# Patient Record
Sex: FEMALE | Race: WHITE | Employment: FULL TIME | URBAN - NONMETROPOLITAN AREA
[De-identification: names, ages, dates, MRNs, and addresses within clinical notes are randomized per-mention and may not be internally consistent; named-entity substitution may affect disease eponyms.]

---

## 2020-09-28 ENCOUNTER — OFFICE VISIT (OUTPATIENT)
Dept: OTOLARYNGOLOGY | Age: 35
End: 2020-09-28
Payer: COMMERCIAL

## 2020-09-28 VITALS
BODY MASS INDEX: 49.26 KG/M2 | SYSTOLIC BLOOD PRESSURE: 132 MMHG | HEIGHT: 63 IN | DIASTOLIC BLOOD PRESSURE: 80 MMHG | WEIGHT: 278 LBS

## 2020-09-28 DIAGNOSIS — K14.0 GLOSSITIS: ICD-10-CM

## 2020-09-28 LAB
FOLATE: 10.1 NG/ML (ref 4.8–37.3)
IRON: 57 UG/DL (ref 37–145)
VITAMIN B-12: 371 PG/ML (ref 211–946)

## 2020-09-28 PROCEDURE — 99243 OFF/OP CNSLTJ NEW/EST LOW 30: CPT | Performed by: OTOLARYNGOLOGY

## 2020-09-28 RX ORDER — LORATADINE 10 MG/1
TABLET ORAL
COMMUNITY
Start: 2020-09-04

## 2020-09-28 RX ORDER — OMEPRAZOLE 20 MG/1
CAPSULE, DELAYED RELEASE ORAL
COMMUNITY
Start: 2020-09-04

## 2020-09-28 RX ORDER — METHYLPREDNISOLONE 4 MG/1
TABLET ORAL
COMMUNITY
Start: 2020-09-22

## 2020-09-28 NOTE — ASSESSMENT & PLAN NOTE
For past month or so patient reports intermittent tongue inflammation. She had photographs showing a deep fissure down the middle of her tongue with some surrounding inflammation. She states that she could feel nodules beneath the surface of her tongue. No cristobal ulceration is reported. She has been treated with antibiotics nystatin rinse and steroids. Currently she is much improved which she attributes to the steroids. glossitis

## 2020-09-28 NOTE — PROGRESS NOTES
DENTAL SURGERY      all teeth removed     WISDOM TOOTH EXTRACTION           REVIEW OF SYSTEMS:  all other systems reviewed and are negative  General Health: see HPI / problem list  Throat: See HPI       Comments:     PHYSICAL EXAM:    /80   Ht 5' 3\" (1.6 m)   Wt 278 lb (126.1 kg)   BMI 49.25 kg/m²   Body mass index is 49.25 kg/m². General Appearance: well developed , well nourished and obese  Head/ Face: normocephalic and atraumatic  Vocal Quality: good/ normal  Oral:lips: normal Oropharynx:normal tongue: No cracks or fissures in tongue on today's exam.  Actually her tongue was rather smooth and glassy. No palpable nodules. Unremarkable exam   Dentition: edentulous full   Neck: negative and supple  Psych/ Mood: cooperative    Assessment & Plan:    Problem List Items Addressed This Visit     Glossitis     For past month or so patient reports intermittent tongue inflammation. She had photographs showing a deep fissure down the middle of her tongue with some surrounding inflammation. She states that she could feel nodules beneath the surface of her tongue. No cristobal ulceration is reported. She has been treated with antibiotics nystatin rinse and steroids. Currently she is much improved which she attributes to the steroids. glossitis         Relevant Orders    Vitamin B1    Niacin (Vitamin B3)    Iron    Vitamin B12 & Folate    JOSE ROBERTO Screen with Reflex          Orders Placed This Encounter   Procedures    Vitamin B1     Standing Status:   Future     Standing Expiration Date:   9/28/2021    Niacin (Vitamin B3)     Standing Status:   Future     Standing Expiration Date:   9/28/2021    Iron     Order Specific Question:   Is Patient Fasting? Answer:   No     Order Specific Question:   No of Hours?      Answer:   3    Vitamin B12 & Folate     Standing Status:   Future     Standing Expiration Date:   9/28/2021    JOSE ROBERTO Screen with Reflex     Standing Status:   Future     Standing Expiration Date: 9/28/2021       No orders of the defined types were placed in this encounter. Please note that this chart was generated using dragon dictation software. Although every effort was made to ensure the accuracy of this automated transcription, some errors in transcription may have occurred.

## 2020-09-30 ENCOUNTER — TELEPHONE (OUTPATIENT)
Dept: OTOLARYNGOLOGY | Age: 35
End: 2020-09-30

## 2020-09-30 LAB — ANA IGG, ELISA: NORMAL

## 2020-09-30 NOTE — TELEPHONE ENCOUNTER
I was calling patient with results per Dr. Rhodia Goodpasture,     ----- Message from Kathleen Carrasco MD sent at 9/30/2020  8:27 AM CDT -----  Let patient know that her iron level was normal and send a copy to the referring doctor  ----- Message -----  From: Nando Cabrera Incoming Lab Results From [facility]  Sent: 9/28/2020   7:02 PM CDT  To: Kathleen Carrasco MD      Patient voiced agreement.

## 2020-10-03 LAB — VITAMIN B1, PLASMA: 5 NMOL/L (ref 4–15)

## 2020-10-06 LAB — NIACIN: 1.6 UG/ML (ref 0.5–8.45)

## 2020-10-13 RX ORDER — FLUCONAZOLE 100 MG/1
TABLET ORAL
Qty: 15 TABLET | Refills: 0 | Status: SHIPPED | OUTPATIENT
Start: 2020-10-13

## 2020-10-13 NOTE — PROGRESS NOTES
Patient grew yeast on mouth culture, Dr Neel Duffy wanted to prescribe Diflucan to treat patients symptoms. I sent a prescription into her pharmacy.

## 2020-11-02 LAB
FUNGUS (MYCOLOGY) CULTURE: ABNORMAL
KOH PREP: ABNORMAL
ORGANISM: ABNORMAL

## 2023-07-12 PROBLEM — K14.0 GLOSSITIS: Status: ACTIVE | Noted: 2020-09-28

## 2023-07-19 ENCOUNTER — PREP FOR SURGERY (OUTPATIENT)
Dept: OTHER | Facility: HOSPITAL | Age: 38
End: 2023-07-19
Payer: COMMERCIAL

## 2023-07-19 DIAGNOSIS — R13.10 DYSPHAGIA, UNSPECIFIED TYPE: Primary | ICD-10-CM

## 2023-07-27 ENCOUNTER — OFFICE VISIT (OUTPATIENT)
Dept: FAMILY MEDICINE CLINIC | Facility: CLINIC | Age: 38
End: 2023-07-27
Payer: COMMERCIAL

## 2023-07-27 VITALS
HEIGHT: 63 IN | OXYGEN SATURATION: 97 % | SYSTOLIC BLOOD PRESSURE: 100 MMHG | BODY MASS INDEX: 50.85 KG/M2 | HEART RATE: 95 BPM | DIASTOLIC BLOOD PRESSURE: 70 MMHG | WEIGHT: 287 LBS

## 2023-07-27 DIAGNOSIS — E11.65 TYPE 2 DIABETES MELLITUS WITH HYPERGLYCEMIA, WITHOUT LONG-TERM CURRENT USE OF INSULIN: Primary | ICD-10-CM

## 2023-07-27 DIAGNOSIS — E78.2 MIXED HYPERLIPIDEMIA: ICD-10-CM

## 2023-07-27 DIAGNOSIS — R79.89 LOW SERUM VITAMIN D: ICD-10-CM

## 2023-07-27 RX ORDER — ERGOCALCIFEROL 1.25 MG/1
CAPSULE ORAL
Qty: 4 CAPSULE | Refills: 5 | Status: SHIPPED | OUTPATIENT
Start: 2023-07-27

## 2023-07-27 RX ORDER — METFORMIN HYDROCHLORIDE 500 MG/1
TABLET, EXTENDED RELEASE ORAL
Qty: 60 TABLET | Refills: 5 | Status: SHIPPED | OUTPATIENT
Start: 2023-07-27

## 2023-07-27 RX ORDER — ROSUVASTATIN CALCIUM 5 MG/1
5 TABLET, COATED ORAL DAILY
Qty: 90 TABLET | Refills: 1 | Status: SHIPPED | OUTPATIENT
Start: 2023-07-27

## 2023-07-27 NOTE — PROGRESS NOTES
Office Note     Name: Gauri Gordon    : 1985     MRN: [id number]     Chief Complaint  follow up on blood work    Subjective     History of Present Illness:  Gauri Gordon is a 37 y.o. female who presents today for follow-up to lab work. She reports she has cut back on monster drinks.  She does work third shift at the hotel and states she often only eats Ramen noodles for dinner.     Review of Systems:   Review of Systems as per HPI.     Family History:   Family History   Problem Relation Age of Onset    Ovarian cancer Mother     Alcohol abuse Mother     Cancer Mother     Depression Mother     Diabetes Father     Hypertension Father     Hyperlipidemia Father     Throat cancer Maternal Uncle     COPD Maternal Grandfather     Cancer Maternal Grandmother     Hearing loss Maternal Grandmother     Diabetes Paternal Grandmother     Alcohol abuse Maternal Uncle     Cancer Maternal Uncle     Diabetes Paternal Aunt        Social History:   Social History     Socioeconomic History    Marital status: Single   Tobacco Use    Smoking status: Every Day     Packs/day: 0.50     Years: 20.00     Pack years: 10.00     Types: Cigarettes     Start date:      Passive exposure: Current    Tobacco comments:     Been a smoker most of my life   Vaping Use    Vaping Use: Never used   Substance and Sexual Activity    Alcohol use: Not Currently     Comment: Might drink once every few months and its just a beer or 2    Drug use: Never    Sexual activity: Yes     Partners: Male     Birth control/protection: Condom       Past Medical History:   Past Medical History:   Diagnosis Date    Anxiety     Asthma     GERD (gastroesophageal reflux disease)     History of medical problems     PCOS i was 16 cant remember dates    Obesity        Past Surgical History:   Past Surgical History:   Procedure Laterality Date     SECTION  2006       Immunizations:   Immunization History   Administered Date(s)  [facility]    Phone Message    May a detailed message be left on voicemail: yes     Reason for Call: Sonam from [facility] calling to verify the procedure the patient had done on 4/19 with Dr. Love.   Sonam can be reach at [phone]   Claim Number: [id number]    Thank you!    Action Taken: Message routed to:  Other: [facility]    Travel Screening: Not Applicable                                                                         "Administered    DTP 04/22/1986, 02/04/1987, 04/22/1987, 09/21/1988, 06/06/1990    Hep B, Adolescent or Pediatric 08/02/2000, 08/27/2002    HiB 09/21/1988    MMR 04/22/1987, 08/26/1997    Polio, Unspecified 04/22/1986, 02/04/1987, 09/26/1988, 06/06/1990    Td (TDVAX) 08/27/2002        Medications:     Current Outpatient Medications:     busPIRone (BUSPAR) 10 MG tablet, , Disp: , Rfl:     famotidine (PEPCID) 20 MG tablet, Take 1 tablet by mouth 2 (Two) Times a Day., Disp: , Rfl:     metFORMIN ER (GLUCOPHAGE-XR) 500 MG 24 hr tablet, Take 1 tablet at bedtime daily for 2 weeks, then increase to 2 tablets at bedtime daily, Disp: 60 tablet, Rfl: 5    rosuvastatin (Crestor) 5 MG tablet, Take 1 tablet by mouth Daily., Disp: 90 tablet, Rfl: 1    vitamin D (ERGOCALCIFEROL) 1.25 MG (65403 UT) capsule capsule, Take 1 capsules once a week, Disp: 4 capsule, Rfl: 5    Allergies:   Allergies   Allergen Reactions    Sulfamethoxazole-Trimethoprim Other (See Comments)     Flu like symptoms       Objective     Vital Signs  /70 (BP Location: Right arm, Patient Position: Sitting, Cuff Size: Large Adult)   Pulse 95   Ht 160 cm (63\")   Wt 130 kg (287 lb)   SpO2 97%   BMI 50.84 kg/m²   Estimated body mass index is 50.84 kg/m² as calculated from the following:    Height as of this encounter: 160 cm (63\").    Weight as of this encounter: 130 kg (287 lb).            Physical Exam  Constitutional:       Appearance: Normal appearance.   HENT:      Head: Normocephalic and atraumatic.      Nose: Nose normal.      Mouth/Throat:      Mouth: Mucous membranes are moist.      Pharynx: Oropharynx is clear.   Eyes:      Conjunctiva/sclera: Conjunctivae normal.      Pupils: Pupils are equal, round, and reactive to light.   Cardiovascular:      Rate and Rhythm: Normal rate and regular rhythm.   Pulmonary:      Effort: Pulmonary effort is normal.   Skin:     General: Skin is warm and dry.   Neurological:      Mental Status: She is alert and " oriented to person, place, and time.   Psychiatric:         Mood and Affect: Mood normal.         Behavior: Behavior normal.        Procedures    Assessment and Plan     1. Type 2 diabetes mellitus with hyperglycemia, without long-term current use of insulin    - metFORMIN ER (GLUCOPHAGE-XR) 500 MG 24 hr tablet; Take 1 tablet at bedtime daily for 2 weeks, then increase to 2 tablets at bedtime daily  Dispense: 60 tablet; Refill: 5  - Comprehensive Metabolic Panel; Future  - Hemoglobin A1c; Future  - CBC Auto Differential; Future    2. Low serum vitamin D    - vitamin D (ERGOCALCIFEROL) 1.25 MG (67619 UT) capsule capsule; Take 1 capsules once a week  Dispense: 4 capsule; Refill: 5    3. Mixed hyperlipidemia    - rosuvastatin (Crestor) 5 MG tablet; Take 1 tablet by mouth Daily.  Dispense: 90 tablet; Refill: 1  - Lipid Panel; Future    Reviewed lab results with patient: A1c 6.8. LIPID: ; ; HDL 35; . Vitamin D 7.6. Glucose was not fasting at 138.  Will start metformin - discussed titration schedule with patient.  We will also start Crestor as well as a weekly vitamin D supplement.  Reviewed potential side effects with patient.  Encouraged to continue working on diet including cutting out excess sugar intake and less processed starchy carbs. She remains active at her job at the [employer].  Patient to return to clinic in 3 months for repeat fasting lab work and follow-up appointment to review labs as well as medication efficacy.  She is to return sooner for any concerns or new symptoms.  Patient stated understanding and agreed with plan of care. Time spent in face to face consultation >50% of total visit time of 15 minutes.    Follow Up  Return in about 3 months (around 10/27/2023) for Recheck/fasting labwork.    [facility]  [address] [facility] [address] [facility] [id number] [phone]

## 2023-08-08 PROBLEM — R13.10 DYSPHAGIA: Status: ACTIVE | Noted: 2023-08-08

## 2023-08-09 RX ORDER — SODIUM, POTASSIUM,MAG SULFATES 17.5-3.13G
2 SOLUTION, RECONSTITUTED, ORAL ORAL TAKE AS DIRECTED
Qty: 354 ML | Refills: 0 | Status: CANCELLED | OUTPATIENT
Start: 2023-08-09

## 2023-08-23 ENCOUNTER — ANESTHESIA EVENT (OUTPATIENT)
Dept: GASTROENTEROLOGY | Facility: HOSPITAL | Age: 38
End: 2023-08-23
Payer: COMMERCIAL

## 2023-08-23 RX ORDER — SODIUM CHLORIDE 0.9 % (FLUSH) 0.9 %
10 SYRINGE (ML) INJECTION EVERY 12 HOURS SCHEDULED
Status: CANCELLED | OUTPATIENT
Start: 2023-08-23

## 2023-08-23 RX ORDER — FAMOTIDINE 20 MG/1
20 TABLET, FILM COATED ORAL ONCE
Status: CANCELLED | OUTPATIENT
Start: 2023-08-23 | End: 2023-08-23

## 2023-08-23 RX ORDER — MIDAZOLAM HYDROCHLORIDE 1 MG/ML
1 INJECTION INTRAMUSCULAR; INTRAVENOUS
Status: CANCELLED | OUTPATIENT
Start: 2023-08-23

## 2023-08-23 RX ORDER — LIDOCAINE HYDROCHLORIDE 10 MG/ML
0.5 INJECTION, SOLUTION EPIDURAL; INFILTRATION; INTRACAUDAL; PERINEURAL ONCE AS NEEDED
Status: CANCELLED | OUTPATIENT
Start: 2023-08-23

## 2023-08-24 ENCOUNTER — HOSPITAL ENCOUNTER (OUTPATIENT)
Facility: HOSPITAL | Age: 38
Setting detail: HOSPITAL OUTPATIENT SURGERY
Discharge: HOME | End: 2023-08-24
Attending: INTERNAL MEDICINE | Admitting: INTERNAL MEDICINE
Payer: COMMERCIAL

## 2023-08-24 ENCOUNTER — ANESTHESIA (OUTPATIENT)
Dept: GASTROENTEROLOGY | Facility: HOSPITAL | Age: 38
End: 2023-08-24
Payer: COMMERCIAL

## 2023-08-24 VITALS
SYSTOLIC BLOOD PRESSURE: 116 MMHG | HEART RATE: 79 BPM | OXYGEN SATURATION: 97 % | RESPIRATION RATE: 18 BRPM | TEMPERATURE: 97 F | HEIGHT: 63 IN | DIASTOLIC BLOOD PRESSURE: 77 MMHG | WEIGHT: 287 LBS | BODY MASS INDEX: 50.85 KG/M2

## 2023-08-24 DIAGNOSIS — R13.10 DYSPHAGIA, UNSPECIFIED TYPE: ICD-10-CM

## 2023-08-24 LAB
B-HCG UR QL: NEGATIVE
EXPIRATION DATE: NORMAL
GLUCOSE BLDC GLUCOMTR-MCNC: 164 MG/DL (ref 70–130)
INTERNAL NEGATIVE CONTROL: NORMAL
INTERNAL POSITIVE CONTROL: NORMAL
Lab: NORMAL

## 2023-08-24 PROCEDURE — C1725 CATH, TRANSLUMIN NON-LASER: HCPCS | Performed by: INTERNAL MEDICINE

## 2023-08-24 PROCEDURE — 25010000002 PROPOFOL 10 MG/ML EMULSION: Performed by: NURSE ANESTHETIST, CERTIFIED REGISTERED

## 2023-08-24 PROCEDURE — 43249 ESOPH EGD DILATION <30 MM: CPT | Performed by: INTERNAL MEDICINE

## 2023-08-24 PROCEDURE — 99204 OFFICE O/P NEW MOD 45 MIN: CPT | Performed by: INTERNAL MEDICINE

## 2023-08-24 PROCEDURE — 81025 URINE PREGNANCY TEST: CPT | Performed by: ANESTHESIOLOGY

## 2023-08-24 PROCEDURE — 88305 TISSUE EXAM BY PATHOLOGIST: CPT | Performed by: INTERNAL MEDICINE

## 2023-08-24 PROCEDURE — 82948 REAGENT STRIP/BLOOD GLUCOSE: CPT

## 2023-08-24 PROCEDURE — 43239 EGD BIOPSY SINGLE/MULTIPLE: CPT | Performed by: INTERNAL MEDICINE

## 2023-08-24 RX ORDER — FAMOTIDINE 10 MG/ML
20 INJECTION, SOLUTION INTRAVENOUS ONCE
Status: COMPLETED | OUTPATIENT
Start: 2023-08-24 | End: 2023-08-24

## 2023-08-24 RX ORDER — SODIUM CHLORIDE 9 MG/ML
40 INJECTION, SOLUTION INTRAVENOUS AS NEEDED
Status: DISCONTINUED | OUTPATIENT
Start: 2023-08-24 | End: 2023-08-24 | Stop reason: HOSPADM

## 2023-08-24 RX ORDER — FENTANYL CITRATE 50 UG/ML
50 INJECTION, SOLUTION INTRAMUSCULAR; INTRAVENOUS
Status: DISCONTINUED | OUTPATIENT
Start: 2023-08-24 | End: 2023-08-24 | Stop reason: HOSPADM

## 2023-08-24 RX ORDER — HYDROMORPHONE HYDROCHLORIDE 1 MG/ML
0.5 INJECTION, SOLUTION INTRAMUSCULAR; INTRAVENOUS; SUBCUTANEOUS
Status: DISCONTINUED | OUTPATIENT
Start: 2023-08-24 | End: 2023-08-24 | Stop reason: HOSPADM

## 2023-08-24 RX ORDER — OMEPRAZOLE 40 MG/1
40 CAPSULE, DELAYED RELEASE ORAL DAILY
Qty: 30 CAPSULE | Refills: 11 | Status: SHIPPED | OUTPATIENT
Start: 2023-08-24

## 2023-08-24 RX ORDER — PROPOFOL 10 MG/ML
VIAL (ML) INTRAVENOUS AS NEEDED
Status: DISCONTINUED | OUTPATIENT
Start: 2023-08-24 | End: 2023-08-24 | Stop reason: SURG

## 2023-08-24 RX ORDER — ONDANSETRON 2 MG/ML
4 INJECTION INTRAMUSCULAR; INTRAVENOUS ONCE AS NEEDED
Status: DISCONTINUED | OUTPATIENT
Start: 2023-08-24 | End: 2023-08-24 | Stop reason: HOSPADM

## 2023-08-24 RX ORDER — SODIUM CHLORIDE 9 MG/ML
INJECTION, SOLUTION INTRAVENOUS CONTINUOUS PRN
Status: DISCONTINUED | OUTPATIENT
Start: 2023-08-24 | End: 2023-08-24 | Stop reason: SURG

## 2023-08-24 RX ORDER — SODIUM CHLORIDE, SODIUM LACTATE, POTASSIUM CHLORIDE, CALCIUM CHLORIDE 600; 310; 30; 20 MG/100ML; MG/100ML; MG/100ML; MG/100ML
9 INJECTION, SOLUTION INTRAVENOUS CONTINUOUS
Status: DISCONTINUED | OUTPATIENT
Start: 2023-08-24 | End: 2023-08-24 | Stop reason: HOSPADM

## 2023-08-24 RX ORDER — IPRATROPIUM BROMIDE AND ALBUTEROL SULFATE 2.5; .5 MG/3ML; MG/3ML
3 SOLUTION RESPIRATORY (INHALATION) ONCE AS NEEDED
Status: DISCONTINUED | OUTPATIENT
Start: 2023-08-24 | End: 2023-08-24 | Stop reason: HOSPADM

## 2023-08-24 RX ORDER — SODIUM CHLORIDE 0.9 % (FLUSH) 0.9 %
10 SYRINGE (ML) INJECTION AS NEEDED
Status: DISCONTINUED | OUTPATIENT
Start: 2023-08-24 | End: 2023-08-24 | Stop reason: HOSPADM

## 2023-08-24 RX ORDER — LIDOCAINE HYDROCHLORIDE 10 MG/ML
INJECTION, SOLUTION EPIDURAL; INFILTRATION; INTRACAUDAL; PERINEURAL AS NEEDED
Status: DISCONTINUED | OUTPATIENT
Start: 2023-08-24 | End: 2023-08-24 | Stop reason: SURG

## 2023-08-24 RX ADMIN — FAMOTIDINE 20 MG: 10 INJECTION INTRAVENOUS at 14:26

## 2023-08-24 RX ADMIN — SODIUM CHLORIDE: 9 INJECTION, SOLUTION INTRAVENOUS at 15:12

## 2023-08-24 RX ADMIN — PROPOFOL 150 MCG/KG/MIN: 10 INJECTION, EMULSION INTRAVENOUS at 15:18

## 2023-08-24 RX ADMIN — PROPOFOL 100 MG: 10 INJECTION, EMULSION INTRAVENOUS at 15:18

## 2023-08-24 RX ADMIN — SODIUM CHLORIDE, POTASSIUM CHLORIDE, SODIUM LACTATE AND CALCIUM CHLORIDE 9 ML/HR: 600; 310; 30; 20 INJECTION, SOLUTION INTRAVENOUS at 14:26

## 2023-08-24 RX ADMIN — LIDOCAINE HYDROCHLORIDE 100 MG: 10 INJECTION, SOLUTION EPIDURAL; INFILTRATION; INTRACAUDAL; PERINEURAL at 15:18

## 2023-08-24 RX ADMIN — Medication 10 ML: at 14:26

## 2023-08-24 NOTE — ANESTHESIA PREPROCEDURE EVALUATION
Anesthesia Evaluation     Patient summary reviewed and Nursing notes reviewed   no history of anesthetic complications:   NPO Solid Status: > 8 hours  NPO Liquid Status: > 2 hours           Airway   Mallampati: I  TM distance: >3 FB  Neck ROM: full  Large neck circumference and No difficulty expected  Dental    (+) edentulous    Pulmonary - normal exam    breath sounds clear to auscultation  (+) asthma,  Cardiovascular - normal exam    ECG reviewed    (+) hyperlipidemia  (-) murmur      Neuro/Psych  (+) psychiatric history Anxiety  GI/Hepatic/Renal/Endo    (+) morbid obesity, GERD, diabetes mellitus    Musculoskeletal     Abdominal    Substance History      OB/GYN      Comment: PCOS      Other                      Anesthesia Plan    ASA 3     general     (PROPOFOL)  intravenous induction     Anesthetic plan, risks, benefits, and alternatives have been provided, discussed and informed consent has been obtained with: patient.    Plan discussed with CRNA.    CODE STATUS:

## 2023-08-24 NOTE — H&P
GASTROENTEROLOGY OFFICE NOTE  Gauri Gordon  [id number]  1985      CHIEF COMPLAINT  Intermittent dysphagia to solids    HISTORY OF PRESENT ILLNESS:  37-year-old white female referred to me by her primary care provider, Gauri VARGAS, with a chief complaint of many years of intermittent dysphagia to solids which require her to chew carefully, eat slowly and drink a lot of water with her meals and despite this from time to time she will have a food impaction where she will have to bring food back up.  She does have chronic GERD type symptoms and denies odynophagia, early satiety, unexplained weight loss, melena or bright red blood per rectum.    PAST MEDICAL HISTORY  Past Medical History:    Anxiety    Asthma    Diabetes mellitus    GERD (gastroesophageal reflux disease)    History of medical problems    PCOS i was 16 cant remember dates    Hyperlipidemia    Obesity    PCOS (polycystic ovarian syndrome)    Wears dentures    full set        PAST SURGICAL HISTORY  Past Surgical History:     SECTION    TEETH EXTRACTION        MEDICATIONS:  Prior to Admission medications    Medication Sig Start Date End Date Taking? Authorizing Provider   famotidine (PEPCID) 20 MG tablet Take 1 tablet by mouth 2 (Two) Times a Day.   Yes Natali Donaldson MD   metFORMIN ER (GLUCOPHAGE-XR) 500 MG 24 hr tablet Take 1 tablet at bedtime daily for 2 weeks, then increase to 2 tablets at bedtime daily  Patient taking differently: Take 2 tablets by mouth Daily. Take 1 tablet at bedtime daily for 2 weeks, then increase to 2 tablets at bedtime daily    Pt is night shift worker, taking at 3 every day 23  Yes Gauri Jennings APRN   rosuvastatin (Crestor) 5 MG tablet Take 1 tablet by mouth Daily. 23  Yes Gauri Jennings APRN   busPIRone (BUSPAR) 10 MG tablet Take 1 tablet by mouth As Needed (anxiety). 22   ProviderNatali MD   vitamin D (ERGOCALCIFEROL) 1.25 MG (22896 UT) capsule capsule Take 1  "capsules once a week  Patient taking differently: Take 1 capsule by mouth. Take 1 capsules once a week 7/27/23   Gauri Jennings APRN        ALLERGIES  is allergic to vinegar [acetic acid] and sulfamethoxazole-trimethoprim.    FAMILY HISTORY:  Cancer-related family history includes Cancer in her maternal grandmother, maternal uncle, and mother; Ovarian cancer in her mother; Throat cancer in her maternal uncle.  Colon Cancer-related family history is not on file.    SOCIAL HISTORY  She  reports that she has been smoking cigarettes. She started smoking about 24 years ago. She has a 10.00 pack-year smoking history. She has been exposed to tobacco smoke. She does not have any smokeless tobacco history on file. She reports that she does not currently use alcohol. She reports that she does not use drugs.   She is single.  She is accompanied by her boyfriend.  She has a 17-year-old daughter.  She works at the  at a hotel in [facility].  She does smoke cigarettes and does not abuse alcohol.    REVIEW OF SYSTEMS  Cardiac, respiratory and generalized review systems are pertinent as reviewed above    PHYSICAL EXAM   /72 (BP Location: Left arm, Patient Position: Sitting)   Pulse 79   Temp 97.2 °F (36.2 °C) (Temporal)   Resp 20   Ht 160 cm (63\")   Wt 130 kg (287 lb)   SpO2 97%   BMI 50.84 kg/m²   General: Alert and oriented x 3. In no apparent or acute distress.  and No stigmata of chronic liver disease  HEENT: Anicteric sclerae. Normal oropharynx  Neck: Supple. Without lymphadenopathy  CV: Regular rate and rhythm, S1, S2  Lungs: Clear to ausculation. Without rales, rhonchi and wheezing  Abdomen:  Soft,non-distended without palpable masses or hepatosplenomeagaly, areas of rebound tenderness or guarding.   Extremeties: without clubbing, cyanosis or edema  Neurologic:  Alert and oriented x 3 without focal motor or sensory deficits  Rectal exam: deferred       ASSESSMENT  1.-Longstanding problems " with intermittent dysphagia to solids in the setting of chronic gastroesophageal reflux disease very suspicious for peptic esophageal stricture need to rule out eosinophilic esophagitis, David's esophagus, erosive esophagitis and remote possibility of neoplasia.  Upper endoscopy is clearly indicated and therefore offered risk of perforation, bleeding, cardiorespiratory compromise and missed lesions have been reviewed and ample opportunity for questions provided.  She wishes to proceed with further recommendation deferred pending findings of today's EGD  2.-Morbid obesity  3.-Other medical problems as detailed above    PLAN  1.-EGD with likely esophageal dilation with further recommendation deferred pending findings of today's upper endoscopy      Cj Lu MD  8/24/2023   15:08 EDT      Addendum  Esophagus revealed patchy salmon-colored mucosa distal 2 cm esophagus consistent with David's esophagus.  Multiple biopsies were taken.  A circumferential ringlike stricture was identified ulcerated EG junction which I biopsied to disrupt and then dilated to 20 mm with through-the-scope balloon.  Recommendation patient remain indefinitely on the PPI to diminish the risk of David's esophagus progressing to malignancy and a surveillance endoscopy is recommended in 3 years assuming confirmation of intestinal metaplasia on her biopsies.

## 2023-08-28 LAB
CYTO UR: NORMAL
LAB AP CASE REPORT: NORMAL
LAB AP CLINICAL INFORMATION: NORMAL
PATH REPORT.FINAL DX SPEC: NORMAL
PATH REPORT.GROSS SPEC: NORMAL

## 2024-08-29 RX ORDER — OMEPRAZOLE 40 MG/1
CAPSULE, DELAYED RELEASE ORAL
Qty: 30 CAPSULE | Refills: 11 | OUTPATIENT
Start: 2024-08-29

## 2024-08-30 RX ORDER — OMEPRAZOLE 40 MG/1
CAPSULE, DELAYED RELEASE ORAL
Qty: 30 CAPSULE | Refills: 11 | OUTPATIENT
Start: 2024-08-30

## 2025-03-31 NOTE — ANESTHESIA POSTPROCEDURE EVALUATION
Patient: Gauri Gordon    Procedure Summary       Date: 08/24/23 Room / Location:  [facility] /  [facility]    Anesthesia Start: 1513 Anesthesia Stop: 1538    Procedure: ESOPHAGOGASTRODUODENOSCOPY WITH ESOPHAGEAL BALLOON DILATION Diagnosis:       Dysphagia, unspecified type      (Dysphagia, unspecified type [R13.10])    Surgeons: Cj Lu MD Provider: Leonard Robert Jr., MD    Anesthesia Type: general ASA Status: 3            Anesthesia Type: general    Vitals  Vitals Value Taken Time   /82 08/24/23 1536   Temp 97 øF (36.1 øC) 08/24/23 1536   Pulse 94 08/24/23 1536   Resp 18 08/24/23 1536   SpO2 96 % 08/24/23 1536           Post Anesthesia Care and Evaluation    Patient location during evaluation: PACU  Patient participation: complete - patient participated  Level of consciousness: awake  Pain score: 0  Pain management: adequate    Airway patency: patent  Anesthetic complications: No anesthetic complications  PONV Status: none  Cardiovascular status: acceptable and hemodynamically stable  Respiratory status: acceptable and nasal cannula  Hydration status: acceptable  No anesthesia care post op    
Do not drive or operate machinery/Walking - Indoors allowed/No heavy lifting/straining

## (undated) DEVICE — SOLIDIFIER LIQ PREMISORB 1500CC

## (undated) DEVICE — "MH-443 SUCTION VALVE F/EVIS140 EVIS160": Brand: SUCTION VALVE

## (undated) DEVICE — BALN DIL ELATION FIX WR 7.5F 180CM 18X19X20MM 1P/U

## (undated) DEVICE — FIRST STEP BEDSIDE ADD WATER KIT - RESEALABLE STAND-UP POUCH, ENDOSCOPIC CLEANING PAD - 1 POUCH: Brand: FIRST STEP BEDSIDE ADD WATER KIT - RESEALABLE STAND-UP POUCH, ENDOSCOPIC CLEANIN

## (undated) DEVICE — DEV INFL CRE STERIFLATE 60CC DISP

## (undated) DEVICE — LUBE JELLY FOIL PACK 1.4 OZ: Brand: MEDLINE INDUSTRIES, INC.

## (undated) DEVICE — SYR LUERLOK 50ML

## (undated) DEVICE — ST LINER SAFECAP GRN RED CP STRL

## (undated) DEVICE — INTRO ACCSR BLNT TP

## (undated) DEVICE — Device: Brand: DEFENDO AIR/WATER/SUCTION AND BIOPSY VALVE

## (undated) DEVICE — KT ORCA ORCAPOD DISP STRL

## (undated) DEVICE — SOL IRR H2O BTL 1000ML STRL

## (undated) DEVICE — HYBRID CO2 TUBING/CAP SET FOR OLYMPUS® SCOPES & CO2 SOURCE: Brand: ERBE

## (undated) DEVICE — CONTN GRAD MEAS TRIANG 32OZ BLK

## (undated) DEVICE — THE BITE BLOCK MAXI, LATEX FREE STRAP IS USED TO PROTECT THE ENDOSCOPE INSERTION TUBE FROM BEING BITTEN BY THE PATIENT.

## (undated) DEVICE — ENDOGATOR HYBRID TUBING KIT FOR USE WITH ENDOGATOR IRRIGATION PUMP, OLYMPUS PUMP, GI4000 ESU, AND TORRENT IRRIGATION PUMP.: Brand: ENDOGATOR KIT

## (undated) DEVICE — ADAPT CLN LUM OLYMP AIR/H20

## (undated) DEVICE — SAFELINER SUCTION CANISTER 1000CC: Brand: DEROYAL

## (undated) DEVICE — TUBING, SUCTION, 1/4" X 10', STRAIGHT: Brand: MEDLINE